# Patient Record
Sex: FEMALE | Race: WHITE | NOT HISPANIC OR LATINO
[De-identification: names, ages, dates, MRNs, and addresses within clinical notes are randomized per-mention and may not be internally consistent; named-entity substitution may affect disease eponyms.]

---

## 2018-08-27 PROBLEM — Z00.00 ENCOUNTER FOR PREVENTIVE HEALTH EXAMINATION: Status: ACTIVE | Noted: 2018-08-27

## 2018-09-04 ENCOUNTER — APPOINTMENT (OUTPATIENT)
Dept: ORTHOPEDIC SURGERY | Facility: CLINIC | Age: 33
End: 2018-09-04

## 2018-09-27 ENCOUNTER — APPOINTMENT (OUTPATIENT)
Dept: PHYSICAL MEDICINE AND REHAB | Facility: CLINIC | Age: 33
End: 2018-09-27

## 2018-11-07 ENCOUNTER — EMERGENCY (EMERGENCY)
Facility: HOSPITAL | Age: 33
LOS: 1 days | Discharge: HOME | End: 2018-11-07
Attending: EMERGENCY MEDICINE | Admitting: EMERGENCY MEDICINE
Payer: COMMERCIAL

## 2018-11-07 VITALS
WEIGHT: 121.92 LBS | OXYGEN SATURATION: 100 % | HEART RATE: 91 BPM | SYSTOLIC BLOOD PRESSURE: 133 MMHG | RESPIRATION RATE: 20 BRPM | DIASTOLIC BLOOD PRESSURE: 95 MMHG | TEMPERATURE: 98 F

## 2018-11-07 DIAGNOSIS — M54.5 LOW BACK PAIN: ICD-10-CM

## 2018-11-07 DIAGNOSIS — M54.9 DORSALGIA, UNSPECIFIED: ICD-10-CM

## 2018-11-07 DIAGNOSIS — Z88.8 ALLERGY STATUS TO OTHER DRUGS, MEDICAMENTS AND BIOLOGICAL SUBSTANCES: ICD-10-CM

## 2018-11-07 DIAGNOSIS — R20.2 PARESTHESIA OF SKIN: ICD-10-CM

## 2018-11-07 PROCEDURE — 99284 EMERGENCY DEPT VISIT MOD MDM: CPT

## 2018-11-07 PROCEDURE — 96372 THER/PROPH/DIAG INJ SC/IM: CPT

## 2018-11-07 PROCEDURE — 99284 EMERGENCY DEPT VISIT MOD MDM: CPT | Mod: 25

## 2018-11-07 RX ORDER — ONDANSETRON 8 MG/1
4 TABLET, FILM COATED ORAL ONCE
Qty: 0 | Refills: 0 | Status: COMPLETED | OUTPATIENT
Start: 2018-11-07 | End: 2018-11-07

## 2018-11-07 RX ORDER — MORPHINE SULFATE 50 MG/1
4 CAPSULE, EXTENDED RELEASE ORAL ONCE
Qty: 0 | Refills: 0 | Status: DISCONTINUED | OUTPATIENT
Start: 2018-11-07 | End: 2018-11-07

## 2018-11-07 RX ADMIN — ONDANSETRON 4 MILLIGRAM(S): 8 TABLET, FILM COATED ORAL at 22:56

## 2018-11-07 RX ADMIN — MORPHINE SULFATE 4 MILLIGRAM(S): 50 CAPSULE, EXTENDED RELEASE ORAL at 22:52

## 2018-11-07 NOTE — ED ADULT NURSE NOTE - OBJECTIVE STATEMENT
Patient presents to the ED with back pain.  she has a history of this pain, has had multiple MRI's and epidurals.  she came in today because pain is unmanageable.  denies any numbness, tingling to legs.  AA&OX3, respirations unlabored, non-diaphoretic, no pallor.

## 2018-11-07 NOTE — ED PROVIDER NOTE - CARE PLAN
Principal Discharge DX:	Back pain  Assessment and plan of treatment:	Likely related to herniated lumbar disc. No alarm features on physical exam or history. Recent MRI performed one week ago

## 2018-11-07 NOTE — ED PROVIDER NOTE - OBJECTIVE STATEMENT
33F PMH L5-S1 herniated disc, cervical radiculopathy, anxiety who presents today with acute back pain that is worse from her baseline. She had an MRI a few days ago which showed the L5-S1 herniation. She has been following with a PMR physician and has received epidural injections. She takes hydrocodone and diclofenac for her pain. She reports her gait difficulty is at baseline. Reports paresthesia of the right extremity which is similar to before. No saddle anesthesia. No bowel or bladder incontinence.

## 2018-11-07 NOTE — ED ADULT NURSE NOTE - NSSISCREENINGQ1_ED_A_ED
Problem: Patient Care Overview  Goal: Plan of Care Review  Outcome: Ongoing (interventions implemented as appropriate)   01/08/18 0911   Coping/Psychosocial   Plan Of Care Reviewed With patient;family     Luci 10/10. AAOx4. VSS per flow sheet. Boothe catheter removed 0845; has urge to void. Pain tolerable per patient. No n/v noted. Hand off report to ESEQUIEL Aguilera SDS. See chart for full assessment.     Problem: Surgery Nonspecified (Adult)  Goal: Signs and Symptoms of Listed Potential Problems Will be Absent, Minimized or Managed (Surgery Nonspecified)  Signs and symptoms of listed potential problems will be absent, minimized or managed by discharge/transition of care (reference Surgery Nonspecified (Adult) CPG).   Outcome: Ongoing (interventions implemented as appropriate)   01/08/18 0911   Surgery Nonspecified   Problems Assessed (Surgery) bleeding/anemia;pain;postoperative nausea and vomiting;postoperative urinary retention;respiratory compromise;situational response   Problems Present (Surgery) pain     Goal: Anesthesia/Sedation Recovery  Outcome: Outcome(s) achieved Date Met: 01/08/18 01/08/18 0911   Goal/Outcome Evaluation   Anesthesia/Sedation Recovery recovered to baseline;criteria met for transfer         
No

## 2018-11-07 NOTE — ED PROVIDER NOTE - ATTENDING CONTRIBUTION TO CARE
exacerbation of chronic low back pain.  no trauma, fever, weakness, bowel/bladder dysfunction.  had mri with report from 10/30 showing disc bulge without signs of cauda equina. given im morphine with improvement.  ambulatory.  has apt for f/u and epidural injection on monday

## 2018-11-07 NOTE — ED PROVIDER NOTE - MEDICAL DECISION MAKING DETAILS
Patient with recent MRI showing L5-S1 herniated disc. No saddle anesthesia, muscle strength at baseline, no bowel or bladder incontinence, right lateral leg paresthesia which is baseline. Will treat pain with morphine and patient already has follow up scheduled with PMR.

## 2018-11-07 NOTE — ED PROVIDER NOTE - PLAN OF CARE
Likely related to herniated lumbar disc. No alarm features on physical exam or history. Recent MRI performed one week ago

## 2018-11-07 NOTE — ED PROVIDER NOTE - PHYSICAL EXAMINATION
Constitutional: NAD, non-toxic, lying in bed comfortably  HEENT: no eye discharge, no nasal discharge, no pharyngeal erythema, moist membranes  Neck: no lymphadenopathy, no JVD,  no carotid bruit  Cardiovascular: S1 and S2, RRR,  no M/R/G, non-displaced PMI  Respiratory: no wheezing, no rhonchi, no cough, no crackles   Gastrointestinal: BS+, soft, non-distended, non-tender, no ascites  Extremities: warm to touch, no edema  Vascular: 2+ peripheral pulses, normal capillary refill  Neurological: AAO x 4, PERRLA, EOMI, no nystagmus, 4/5 muscle strength in b/l LE, sensation intact   Psychiatric: normal mood, normal affect  Musculoskeletal: no joint swelling, no point tenderness over spine  Skin: No rashes, no skin breakdown

## 2018-11-07 NOTE — ED ADULT NURSE NOTE - CHIEF COMPLAINT QUOTE
pt with hx of multiple herniated discs has MD's at [facility] complaining of increased back pain radiating down b/l legs with left arm pain as well. Denies numbness/ tingling took some Hydrocodone prior to arrival

## 2018-11-07 NOTE — ED ADULT TRIAGE NOTE - CHIEF COMPLAINT QUOTE
pt with hx of multiple herniated discs has MD's at [facility] complaining of increased back pain radiating down b/l legs with left arm pain as well. Denies numbness/ tingling took some Hydrocodone prior to arrival

## 2018-11-16 ENCOUNTER — EMERGENCY (EMERGENCY)
Facility: HOSPITAL | Age: 33
LOS: 1 days | Discharge: HOME | End: 2018-11-16
Admitting: EMERGENCY MEDICINE
Payer: COMMERCIAL

## 2018-11-16 VITALS
DIASTOLIC BLOOD PRESSURE: 87 MMHG | RESPIRATION RATE: 16 BRPM | OXYGEN SATURATION: 98 % | SYSTOLIC BLOOD PRESSURE: 135 MMHG | HEART RATE: 101 BPM

## 2018-11-16 VITALS
SYSTOLIC BLOOD PRESSURE: 150 MMHG | HEART RATE: 105 BPM | WEIGHT: 125 LBS | DIASTOLIC BLOOD PRESSURE: 98 MMHG | TEMPERATURE: 99 F | OXYGEN SATURATION: 100 % | RESPIRATION RATE: 16 BRPM

## 2018-11-16 PROCEDURE — 96372 THER/PROPH/DIAG INJ SC/IM: CPT

## 2018-11-16 PROCEDURE — 99284 EMERGENCY DEPT VISIT MOD MDM: CPT

## 2018-11-16 PROCEDURE — 99284 EMERGENCY DEPT VISIT MOD MDM: CPT | Mod: 25

## 2018-11-16 RX ORDER — ONDANSETRON 8 MG/1
4 TABLET, FILM COATED ORAL ONCE
Qty: 0 | Refills: 0 | Status: COMPLETED | OUTPATIENT
Start: 2018-11-16 | End: 2018-11-16

## 2018-11-16 RX ORDER — MORPHINE SULFATE 50 MG/1
4 CAPSULE, EXTENDED RELEASE ORAL ONCE
Qty: 0 | Refills: 0 | Status: DISCONTINUED | OUTPATIENT
Start: 2018-11-16 | End: 2018-11-16

## 2018-11-16 RX ADMIN — MORPHINE SULFATE 4 MILLIGRAM(S): 50 CAPSULE, EXTENDED RELEASE ORAL at 21:09

## 2018-11-16 RX ADMIN — Medication 30 MILLILITER(S): at 21:10

## 2018-11-16 RX ADMIN — ONDANSETRON 4 MILLIGRAM(S): 8 TABLET, FILM COATED ORAL at 20:47

## 2018-11-16 RX ADMIN — MORPHINE SULFATE 4 MILLIGRAM(S): 50 CAPSULE, EXTENDED RELEASE ORAL at 20:47

## 2018-11-16 NOTE — ED PROVIDER NOTE - PHYSICAL EXAMINATION
CONSTITUTIONAL: WD,WN. NAD.    SKIN: Normal color and turgor. No rash.    HEAD: NC/AT.  EYES: Conjunctiva clear. EOMI. PERRL.    ENT: Airway patent, OP clear.   RESPIRATORY:  Breathing non-labored. No retractions or accessory muscle use.  Lungs CTA bilat.  CARDIOVASCULAR:  RRR, S1S2. No M/R/G.      GI:  Abdomen soft, nontender.    MSK: Neck supple with painless ROM.  No lower extremity edema or calf tenderness.  No joint swelling or ROM limitation.  NEURO: Alert and oriented; CN II-XII grossly intact. Speech clear.  5/5 strength deltoids, biceps, triceps, hand , hip flexors, foot plantarflexion, foot dorsiflexion bilaterally.  SILT all extremities.  DTR +2 bilateral quadriceps.  Normal balance and gait.

## 2018-11-16 NOTE — ED PROVIDER NOTE - OBJECTIVE STATEMENT
pt with hx of anxiety, chronic neck and back pain is in process of tapering down her hydrocodone dosage; She filled a 14-day supply prescription for hydrocodone 7.5 mg BID on Nov 5th.  She had a few days when the pain was worse and she needed an extra dose, so she ran out 2-3 days ago.  She has been taking ibuprofen without much relief.  Her doctor prescribed a new prescription for hydrocodone 5 mg BID, but the pharmacist did not feel comfortable filling the prescription until Nov 18th.  She comes to the ED with "rebound pain" in her back.  Similar to past neck and back pains. No hx of trauma.  No fever/chills.  No weakness in extremities, no bladder or bowel dysfunction.  She was seen in the ED Nov 7 for exacerbation of the pain, which was relieved by a dose of morphine 4 mg IM.  She had an MRI Oct 30 showing L5-S1 disc bulge. pt with hx of anxiety, chronic neck and back pain is in process of tapering down her hydrocodone dosage; She filled a 14-day supply prescription for hydrocodone 7.5 mg BID on Nov 5th.  She had a few days when the pain was worse and she needed an extra dose, so she ran out 2-3 days ago.  She has been taking ibuprofen without much relief.  Her doctor prescribed a new prescription for hydrocodone 5 mg BID, but the pharmacist did not feel comfortable filling the prescription until Nov 18th.  She comes to the ED with "rebound pain" in her back.  Similar to past neck and back pains. No hx of trauma.  No fever/chills.  No weakness in extremities, no bladder or bowel dysfunction.  She was seen in the ED Nov 7 for exacerbation of the pain, which was relieved by a dose of morphine 4 mg IM.  She showed me her MRI from Oct 30 showing L5-S1 disc bulge.

## 2018-11-16 NOTE — ED ADULT NURSE NOTE - NSIMPLEMENTINTERV_GEN_ALL_ED
Implemented All Universal Safety Interventions:  [place] to call system. Call bell, personal items and telephone within reach. Instruct patient to call for assistance. Room bathroom lighting operational. Non-slip footwear when patient is off stretcher. Physically safe environment: no spills, clutter or unnecessary equipment. Stretcher in lowest position, wheels locked, appropriate side rails in place.

## 2018-11-16 NOTE — ED PROVIDER NOTE - MEDICAL DECISION MAKING DETAILS
Exacerbation of chronic neck and back pain.  Afebrile, no trauma, no bladder/bowel dysfunction. No back pain red flags (TUNAFISH negative).  Normal strength and sensationi on neuro exam.  DTRs intact.  No spine tenderness.  Will give a single dose of analgesia in the ED; she will follow up with her PMR physician.

## 2018-11-16 NOTE — ED ADULT TRIAGE NOTE - CHIEF COMPLAINT QUOTE
Pt w hx of anxiety and herniated discs presents c/o back pain, states she ran out of her hydrocodone (states she's been taking 40 mg daily prior to her epidural on Monday) and OTC meds do not help.

## 2018-11-16 NOTE — ED PROVIDER NOTE - NS ED ROS FT
CONSTITUTIONAL: No fever, chills, or weakness  NEURO: No headache, no dizziness, no syncope; No focal weakness/tingling/numbness  EYES: No visual changes  ENT: No rhinorrhea or sore throat  PULM: No cough or dyspnea  CV: No chest pain or palpitations  GI: No abdominal pain, vomiting, or diarrhea  : No dysuria, hematuria, frequency  MSK: Back pain.  No joint pain.  No swelling in legs.  SKIN: no rash or unusual bruising

## 2018-11-16 NOTE — ED ADULT NURSE NOTE - OBJECTIVE STATEMENT
Pt walked into ED with c/o of lower back pain that causes headache. She ran out of her medication (oxycodone) and over the counter miedication is not helpful. she has hx of herniated disk and had epidural on Monday. She feels like she needs something for pain to just hold her up for the next refill percription.

## 2018-11-17 ENCOUNTER — EMERGENCY (EMERGENCY)
Facility: HOSPITAL | Age: 33
LOS: 1 days | Discharge: LEFT BEFORE DISCHARGE | End: 2018-11-17
Attending: EMERGENCY MEDICINE | Admitting: EMERGENCY MEDICINE
Payer: COMMERCIAL

## 2018-11-17 VITALS
DIASTOLIC BLOOD PRESSURE: 89 MMHG | HEART RATE: 102 BPM | HEIGHT: 67 IN | SYSTOLIC BLOOD PRESSURE: 132 MMHG | TEMPERATURE: 100 F | OXYGEN SATURATION: 99 % | RESPIRATION RATE: 16 BRPM | WEIGHT: 125 LBS

## 2018-11-17 PROBLEM — M51.26 OTHER INTERVERTEBRAL DISC DISPLACEMENT, LUMBAR REGION: Chronic | Status: ACTIVE | Noted: 2018-11-07

## 2018-11-17 PROBLEM — F41.9 ANXIETY DISORDER, UNSPECIFIED: Chronic | Status: ACTIVE | Noted: 2018-11-07

## 2018-11-17 PROCEDURE — 99283 EMERGENCY DEPT VISIT LOW MDM: CPT

## 2018-11-17 PROCEDURE — 99282 EMERGENCY DEPT VISIT SF MDM: CPT

## 2018-11-17 NOTE — ED ADULT TRIAGE NOTE - OTHER COMPLAINTS
Pt also C/O numbness to bilateral LEs. Pt denies urinary nor bowel incontinence, chest pain, SOB, dizziness.

## 2018-11-17 NOTE — ED PROVIDER NOTE - OBJECTIVE STATEMENT
32 y/o female with hx of chronic neck and back pain c/o neck and back pain and ha. pt states ran out of her pain meds and unable to fill percocet Rx until tomorrow as pharmacist reports it is to early to fill. pt given 14 day percocet rx on 11/5/18 but reports due to worsening pain took extra pills. pt notes ha likely due to lack opoid withdrawal. no trauma. no numbness, tingling or weakness. no incontinence. no abd pain, n/v. Pt seen here for same 1 day ago and given morphine injection in ED and same tx in ED on 11/7/18. no further complaints.

## 2018-11-17 NOTE — ED ADULT NURSE NOTE - CHPI ED NUR SYMPTOMS NEG
no chills/no confusion/no abdominal distension/no fever/no abdominal pain/no weakness/no disorientation

## 2018-11-17 NOTE — ED PROVIDER NOTE - MEDICAL DECISION MAKING DETAILS
chronic neck and back pain and ha. pt well appearing. neuro exam intact. Health commerce reviewed and multiple narcotic prescriptions noted. Last rx for percocet was on 11/5/18 for 14 day and pt reports ran out early due to increase in pain. Also noted 2 recent visits and morhpine given for pain. pt demanding narcotic meds in ED. d/w pt will not tx with narcotics and will need to f/u with her pain mgt MD. pt offered  clonidine and refused. pt to f/u with pmd. chronic neck and back pain and ha. pt well appearing. neuro exam intact. Health commerce reviewed and multiple narcotic prescriptions noted. Last rx for percocet was on 11/5/18 for 14 day and pt reports ran out early due to increase in pain. Also noted 2 recent visits and morhpine given for pain. pt demanding narcotic meds in ED. d/w pt will not tx with narcotics and will need to f/u with her pain mgt MD. pt offered  clonidine for symptoms and refused. pt to f/u with pmd. Pt requesting another MD opinion in ED but eloped prior to second MD opinion

## 2018-11-17 NOTE — ED ADULT TRIAGE NOTE - CHIEF COMPLAINT QUOTE
"I have herniated discs. I have back pain but I ran out of her hydrocodone. I was here yesterday and I got morphine shot. I think I need it again."

## 2018-11-17 NOTE — ED PROVIDER NOTE - MUSCULOSKELETAL, MLM
Spine appears normal, range of motion is not limited, no muscle or joint tenderness. no midline c spine, t spine or l spine tenderness. strength 5/5 b/l UE and b/l LE . distal sensation intact.

## 2018-11-17 NOTE — ED PROVIDER NOTE - ATTENDING CONTRIBUTION TO CARE
pt seen and examined with me. 32 yo F with chronic neck and back pain requesting opiates- takes hydrocodone chronically - states she ran out of  her medication in past 2 days- recently seen in ED in past 24 hrs and given Morphine-no chest pain or sob VSS,  slight nausea, no abd pain or diarrhea - I offered clonidine to control possible withdrawal sx- pt refused-I explained that I would not be prescribing opiates in the ED, and the patient subsequently became verbally abusive and argumentative with staff and then eloped.

## 2018-11-17 NOTE — ED ADULT NURSE REASSESSMENT NOTE - NS ED NURSE REASSESS COMMENT FT1
Pt and  stated to Dr Matthews that they are not interested in other medication options besides opioids. Dr Matthews offered clonidine for patient's concerns for opioid withdrawal. Pt states "My  is a ", subsequently raised voices at Dr Matthews with RN at bedside regarding legality of administration of opioids at [facility]. Pt states to MD "can't you please give me something less hardcore? Like tramadol or tylenol-3's?" Dr Matthews declined. Pt's  states "Why did she receive morphine last night? Why does it depend on who attends at the time whether my wife is getting treated for her pain?" Pt states she is allergic to ketorolac and takes ibuprofen all day and it does not work but that ketorolac makes her eyes swell shut. Pt requested again for Tramadol or Tylenol with codeine, Dr Matthews declined. Pt made comfortable in stretcher with warm blankets and water, pt requests to be seen by alternate attending. Before Dr Matthews could return, pt states "I want to be home, if I wanted to just sedate myself all night I'll go home and take klonopin." Pt educated regarding risks of mixing depressants and benzodiazepines and alcohol. Pt states she is leaving at this time. Pt refused to wait for Dr Matthews for discharge, refused discharge paperwork or vital signs at this time. Pt eloped, Dr Matthews made aware. Pt encouraged to follow up outpatient with primary care doctor and pain mgmt team.

## 2018-11-17 NOTE — ED ADULT NURSE REASSESSMENT NOTE - NS ED NURSE REASSESS COMMENT FT1
Pt states "If you're not going to give me morphine or hydromorphone I am leaving." Dr Matthews notified

## 2018-11-17 NOTE — ED ADULT NURSE NOTE - NSIMPLEMENTINTERV_GEN_ALL_ED
Implemented All Universal Safety Interventions:  [place] to call system. Call bell, personal items and telephone within reach. Instruct patient to call for assistance. Room bathroom lighting operational. Non-slip footwear when patient is off stretcher. Physically safe environment: no spills, clutter or unnecessary equipment. Stretcher in lowest position, wheels locked, appropriate side rails in place.

## 2018-11-17 NOTE — ED ADULT NURSE NOTE - OBJECTIVE STATEMENT
32 y/o F presents in ER stating "I have herniated discs, I am being seen by a pain management doctor, I took too many of my hydrocodone and ran out early, I can refill my prescription tomorrow but I am in pain now and want morphine, I was given it yesterday here in the ER." Pt also states she is "withdrawing from opioids" and has a very bad headache and is photosensitive. Pt states she is "humiliated" to be here. States she needs "help with this" and that she is "suffering, and in hell." +nausea. Dr Matthews aware. Pt drowsy and occasionally slurring speech and closing eyes during conversation.

## 2018-11-20 DIAGNOSIS — G89.29 OTHER CHRONIC PAIN: ICD-10-CM

## 2018-11-20 DIAGNOSIS — M54.2 CERVICALGIA: ICD-10-CM

## 2018-11-20 DIAGNOSIS — M54.9 DORSALGIA, UNSPECIFIED: ICD-10-CM

## 2018-11-20 DIAGNOSIS — Z88.8 ALLERGY STATUS TO OTHER DRUGS, MEDICAMENTS AND BIOLOGICAL SUBSTANCES: ICD-10-CM

## 2018-11-21 DIAGNOSIS — M54.2 CERVICALGIA: ICD-10-CM

## 2018-11-21 DIAGNOSIS — M54.9 DORSALGIA, UNSPECIFIED: ICD-10-CM

## 2018-11-21 DIAGNOSIS — Z88.8 ALLERGY STATUS TO OTHER DRUGS, MEDICAMENTS AND BIOLOGICAL SUBSTANCES: ICD-10-CM

## 2018-11-21 DIAGNOSIS — G89.29 OTHER CHRONIC PAIN: ICD-10-CM

## 2019-06-03 NOTE — ED PROVIDER NOTE - CHIEF COMPLAINT
The patient is a 33y Female complaining of back pain general. (0) No drift; limb holds 90 (or 45) degrees for full 10 secs

## 2020-09-08 ENCOUNTER — EMERGENCY (EMERGENCY)
Facility: HOSPITAL | Age: 35
LOS: 1 days | Discharge: HOME | End: 2020-09-08
Attending: EMERGENCY MEDICINE | Admitting: EMERGENCY MEDICINE
Payer: COMMERCIAL

## 2020-09-08 VITALS
DIASTOLIC BLOOD PRESSURE: 83 MMHG | SYSTOLIC BLOOD PRESSURE: 148 MMHG | TEMPERATURE: 100 F | RESPIRATION RATE: 18 BRPM | OXYGEN SATURATION: 100 % | HEART RATE: 116 BPM | HEIGHT: 67 IN | WEIGHT: 123.02 LBS

## 2020-09-08 LAB
ALBUMIN SERPL ELPH-MCNC: 4.2 G/DL — SIGNIFICANT CHANGE UP (ref 3.3–5)
ALP SERPL-CCNC: 53 U/L — SIGNIFICANT CHANGE UP (ref 40–120)
ALT FLD-CCNC: 13 U/L — SIGNIFICANT CHANGE UP (ref 10–45)
ANION GAP SERPL CALC-SCNC: 10 MMOL/L — SIGNIFICANT CHANGE UP (ref 5–17)
APPEARANCE UR: ABNORMAL
APTT BLD: 28.9 SEC — SIGNIFICANT CHANGE UP (ref 27.5–35.5)
AST SERPL-CCNC: 11 U/L — SIGNIFICANT CHANGE UP (ref 10–40)
BASOPHILS # BLD AUTO: 0.04 K/UL — SIGNIFICANT CHANGE UP (ref 0–0.2)
BASOPHILS NFR BLD AUTO: 0.9 % — SIGNIFICANT CHANGE UP (ref 0–2)
BILIRUB SERPL-MCNC: 0.2 MG/DL — SIGNIFICANT CHANGE UP (ref 0.2–1.2)
BILIRUB UR-MCNC: NEGATIVE — SIGNIFICANT CHANGE UP
BUN SERPL-MCNC: 6 MG/DL — LOW (ref 7–23)
CALCIUM SERPL-MCNC: 8.6 MG/DL — SIGNIFICANT CHANGE UP (ref 8.4–10.5)
CHLORIDE SERPL-SCNC: 104 MMOL/L — SIGNIFICANT CHANGE UP (ref 96–108)
CO2 SERPL-SCNC: 24 MMOL/L — SIGNIFICANT CHANGE UP (ref 22–31)
COLOR SPEC: YELLOW — SIGNIFICANT CHANGE UP
CREAT SERPL-MCNC: 0.8 MG/DL — SIGNIFICANT CHANGE UP (ref 0.5–1.3)
DIFF PNL FLD: NEGATIVE — SIGNIFICANT CHANGE UP
EOSINOPHIL # BLD AUTO: 0.03 K/UL — SIGNIFICANT CHANGE UP (ref 0–0.5)
EOSINOPHIL NFR BLD AUTO: 0.7 % — SIGNIFICANT CHANGE UP (ref 0–6)
GLUCOSE SERPL-MCNC: 95 MG/DL — SIGNIFICANT CHANGE UP (ref 70–99)
GLUCOSE UR QL: NEGATIVE — SIGNIFICANT CHANGE UP
HCT VFR BLD CALC: 31.4 % — LOW (ref 34.5–45)
HGB BLD-MCNC: 9.2 G/DL — LOW (ref 11.5–15.5)
IMM GRANULOCYTES NFR BLD AUTO: 0.2 % — SIGNIFICANT CHANGE UP (ref 0–1.5)
INR BLD: 1.02 — SIGNIFICANT CHANGE UP (ref 0.88–1.16)
KETONES UR-MCNC: NEGATIVE — SIGNIFICANT CHANGE UP
LEUKOCYTE ESTERASE UR-ACNC: NEGATIVE — SIGNIFICANT CHANGE UP
LYMPHOCYTES # BLD AUTO: 1.13 K/UL — SIGNIFICANT CHANGE UP (ref 1–3.3)
LYMPHOCYTES # BLD AUTO: 25.6 % — SIGNIFICANT CHANGE UP (ref 13–44)
MCHC RBC-ENTMCNC: 22.6 PG — LOW (ref 27–34)
MCHC RBC-ENTMCNC: 29.3 GM/DL — LOW (ref 32–36)
MCV RBC AUTO: 77.1 FL — LOW (ref 80–100)
MONOCYTES # BLD AUTO: 0.36 K/UL — SIGNIFICANT CHANGE UP (ref 0–0.9)
MONOCYTES NFR BLD AUTO: 8.2 % — SIGNIFICANT CHANGE UP (ref 2–14)
NEUTROPHILS # BLD AUTO: 2.84 K/UL — SIGNIFICANT CHANGE UP (ref 1.8–7.4)
NEUTROPHILS NFR BLD AUTO: 64.4 % — SIGNIFICANT CHANGE UP (ref 43–77)
NITRITE UR-MCNC: NEGATIVE — SIGNIFICANT CHANGE UP
NRBC # BLD: 0 /100 WBCS — SIGNIFICANT CHANGE UP (ref 0–0)
PH UR: 7 — SIGNIFICANT CHANGE UP (ref 5–8)
PLATELET # BLD AUTO: 314 K/UL — SIGNIFICANT CHANGE UP (ref 150–400)
POTASSIUM SERPL-MCNC: 4.1 MMOL/L — SIGNIFICANT CHANGE UP (ref 3.5–5.3)
POTASSIUM SERPL-SCNC: 4.1 MMOL/L — SIGNIFICANT CHANGE UP (ref 3.5–5.3)
PROT SERPL-MCNC: 6.8 G/DL — SIGNIFICANT CHANGE UP (ref 6–8.3)
PROT UR-MCNC: ABNORMAL MG/DL
PROTHROM AB SERPL-ACNC: 12.2 SEC — SIGNIFICANT CHANGE UP (ref 10.6–13.6)
RBC # BLD: 4.07 M/UL — SIGNIFICANT CHANGE UP (ref 3.8–5.2)
RBC # FLD: 14.9 % — HIGH (ref 10.3–14.5)
SODIUM SERPL-SCNC: 138 MMOL/L — SIGNIFICANT CHANGE UP (ref 135–145)
SP GR SPEC: 1.01 — SIGNIFICANT CHANGE UP (ref 1–1.03)
UROBILINOGEN FLD QL: 0.2 E.U./DL — SIGNIFICANT CHANGE UP
WBC # BLD: 4.41 K/UL — SIGNIFICANT CHANGE UP (ref 3.8–10.5)
WBC # FLD AUTO: 4.41 K/UL — SIGNIFICANT CHANGE UP (ref 3.8–10.5)

## 2020-09-08 PROCEDURE — 76830 TRANSVAGINAL US NON-OB: CPT | Mod: 26

## 2020-09-08 PROCEDURE — 76856 US EXAM PELVIC COMPLETE: CPT | Mod: 26

## 2020-09-08 PROCEDURE — 99285 EMERGENCY DEPT VISIT HI MDM: CPT

## 2020-09-08 RX ORDER — FLUOXETINE HCL 10 MG
1 CAPSULE ORAL
Qty: 0 | Refills: 0 | DISCHARGE

## 2020-09-08 RX ORDER — CLONAZEPAM 1 MG
1 TABLET ORAL
Qty: 0 | Refills: 0 | DISCHARGE

## 2020-09-08 RX ORDER — HYOSCYAMINE SULFATE 0.13 MG
1 TABLET ORAL
Qty: 0 | Refills: 0 | DISCHARGE

## 2020-09-08 RX ORDER — ONDANSETRON 8 MG/1
1 TABLET, FILM COATED ORAL
Qty: 0 | Refills: 0 | DISCHARGE

## 2020-09-08 RX ORDER — BUPROPION HYDROCHLORIDE 150 MG/1
1 TABLET, EXTENDED RELEASE ORAL
Qty: 0 | Refills: 0 | DISCHARGE

## 2020-09-08 RX ORDER — ONDANSETRON 8 MG/1
4 TABLET, FILM COATED ORAL ONCE
Refills: 0 | Status: COMPLETED | OUTPATIENT
Start: 2020-09-08 | End: 2020-09-08

## 2020-09-08 RX ORDER — BACLOFEN 100 %
1 POWDER (GRAM) MISCELLANEOUS
Qty: 0 | Refills: 0 | DISCHARGE

## 2020-09-08 RX ORDER — MORPHINE SULFATE 50 MG/1
2 CAPSULE, EXTENDED RELEASE ORAL ONCE
Refills: 0 | Status: DISCONTINUED | OUTPATIENT
Start: 2020-09-08 | End: 2020-09-08

## 2020-09-08 RX ORDER — SODIUM CHLORIDE 9 MG/ML
1000 INJECTION INTRAMUSCULAR; INTRAVENOUS; SUBCUTANEOUS ONCE
Refills: 0 | Status: COMPLETED | OUTPATIENT
Start: 2020-09-08 | End: 2020-09-08

## 2020-09-08 RX ORDER — DEXTROAMPHETAMINE SACCHARATE, AMPHETAMINE ASPARTATE, DEXTROAMPHETAMINE SULFATE AND AMPHETAMINE SULFATE 1.875; 1.875; 1.875; 1.875 MG/1; MG/1; MG/1; MG/1
30 TABLET ORAL
Qty: 0 | Refills: 0 | DISCHARGE

## 2020-09-08 RX ADMIN — SODIUM CHLORIDE 1000 MILLILITER(S): 9 INJECTION INTRAMUSCULAR; INTRAVENOUS; SUBCUTANEOUS at 22:25

## 2020-09-08 RX ADMIN — ONDANSETRON 4 MILLIGRAM(S): 8 TABLET, FILM COATED ORAL at 22:25

## 2020-09-08 RX ADMIN — SODIUM CHLORIDE 1000 MILLILITER(S): 9 INJECTION INTRAMUSCULAR; INTRAVENOUS; SUBCUTANEOUS at 23:25

## 2020-09-08 RX ADMIN — MORPHINE SULFATE 2 MILLIGRAM(S): 50 CAPSULE, EXTENDED RELEASE ORAL at 22:25

## 2020-09-08 RX ADMIN — MORPHINE SULFATE 2 MILLIGRAM(S): 50 CAPSULE, EXTENDED RELEASE ORAL at 22:40

## 2020-09-08 NOTE — ED PROVIDER NOTE - PATIENT PORTAL LINK FT
You can access the FollowMyHealth Patient Portal offered by [facility] by registering at the following website: [email or web address]. By joining [employer]’s FollowMyHealth portal, you will also be able to view your health information using other applications (apps) compatible with our system.

## 2020-09-08 NOTE — ED ADULT NURSE NOTE - OBJECTIVE STATEMENT
LLQ pain on and off  with N/D since Tuesday last week, seen her pmd by tele med on Saturday and prescribed Augmentin 500mg BID and prn Zofran started Saturday, with chest pressure every time with diarrhea since yesterday    diffuse, non-specific tenderness noted to LLQ abdomen, no active emesis

## 2020-09-08 NOTE — ED ADULT TRIAGE NOTE - CHIEF COMPLAINT QUOTE
LLQ pain on and off  with N/D since Tuesday last week, seen her pmd by tele med on Saturday and prescribed Augmentin 500mg BID and prn Zofran started Saturday, with chest pressure every time with diarrhea since yesterday

## 2020-09-08 NOTE — ED PROVIDER NOTE - SHIFT CHANGE DETAILS
35F pcos c/o LLQ abd pain/nausea/diarrhea. +LLQ abd ttp but no adnexal ttp. labs wnl. ct a/p wnl. found to have nearly 4cm L ovarian cyst on pelvic u/s. gyn consulted.    dispo: gyn reccs, anticipate dc home w/ outpatient fu 35F pcos c/o LLQ abd pain/nausea/diarrhea. +LLQ abd ttp but ?adnexal ttp. labs wnl. ct a/p wnl. given persistent LLQ abd pain and ~4cm L ovarian cyst on pelvic u/s, gyn consulted.    dispo: gyn reccs, anticipate dc home w/ outpatient fu

## 2020-09-08 NOTE — ED PROVIDER NOTE - CLINICAL SUMMARY MEDICAL DECISION MAKING FREE TEXT BOX
avss. no systemic sx. no acute surgical abd. no e/o sepsis. LFTs/lipase neg. ua neg. ct a/p w/o acute abnl. found to have ~4cm L ovary w/ persistent LLQ abd pain req morphine. given ?L adnexal ttp on exam and persistent LLQ abd pain, gyn consulted. s/o'd overnight team pending gyn reccs.

## 2020-09-08 NOTE — ED PROVIDER NOTE - OBJECTIVE STATEMENT
35F pcos, ibs, anxiety, c/o acute severe sharp LLQ abd pain radiating to L pelvis lasting ~15min 6d ago w/ subsequent less severe intermittent LLQ abd pain. +nausea. +loose brown diarrhea daily x4d now improved. seen by telemedicine 3d ago and started on augmentin/bentyl for poss diverticulitis but w/o improvement. no fever/chills, no uri/cough, no cp/sob, no vomiting, no vaginal discharge, no pain w/ coitus, no phx std/high risk sexual activity, no hematochezia/melena, no dysuria/hematuria, no rash, no prior covid, no trauma, no recent travel, no phx diverticular disease, no prior colonoscopy. 35F pcos not on ocp, ibs, anxiety, c/o acute severe sharp LLQ abd pain radiating to L pelvis lasting ~15min 6d ago w/ subsequent less severe intermittent LLQ abd pain. +nausea. +loose brown diarrhea daily x4d now improved. seen by telemedicine 3d ago and started on augmentin/bentyl for poss diverticulitis but w/o improvement. no fever/chills, no uri/cough, no cp/sob, no vomiting, no vaginal discharge, no pain w/ coitus, no phx std/high risk sexual activity, no hematochezia/melena, no dysuria/hematuria, no rash, no prior covid, no trauma, no recent travel, no phx diverticular disease, no prior colonoscopy.

## 2020-09-08 NOTE — ED PROVIDER NOTE - PHYSICAL EXAMINATION
CONST: nontoxic NAD speaking in full sentences  HEAD: atraumatic  EYES: conjunctivae clear, PERRL, EOMI  ENT: mmm  NECK: supple  CARD: rrr no murmurs  CHEST: ctab no r/r/w, no stridor/retractions/tripoding  ABD: soft, nd, +ttp LLQ, +guarding, no rebound, no cvat  PELVIS: nl appearing female genitilia, no vaginal discharge, no cmt, no adnexal ttp  EXT: FROM, symmetric distal pulses intact  SKIN: warm, dry, no rash, no pedal edema, cap refill <2sec  NEURO: a+ox3, 5/5 strength x4, gross sensation intact x4, normal gait CONST: nontoxic NAD speaking in full sentences  HEAD: atraumatic  EYES: conjunctivae clear, PERRL, EOMI  ENT: mmm  NECK: supple  CARD: rrr no murmurs  CHEST: ctab no r/r/w, no stridor/retractions/tripoding  ABD: soft, nd, +ttp LLQ, +guarding, no rebound, no cvat  PELVIS: nl appearing female genitilia, no vaginal discharge, no cmt, ?L adnexal ttp  EXT: FROM, symmetric distal pulses intact  SKIN: warm, dry, no rash, no pedal edema, cap refill <2sec  NEURO: a+ox3, 5/5 strength x4, gross sensation intact x4, normal gait

## 2020-09-08 NOTE — ED PROVIDER NOTE - PROGRESS NOTE DETAILS
gyn consulted. will see pt. given persistent LLQ abd pain and neg ct, gyn consulted. will see pt. pt seen by GYN, cleared from their standpoint.   Final CT scan report suggests colitis of transverse and proximal descending colon, will recommend continuing augmentin.

## 2020-09-08 NOTE — ED ADULT NURSE NOTE - CHPI ED NUR SYMPTOMS NEG
no blood in stool/no dysuria/no burning urination/no chills/no hematuria/no abdominal distension/no vomiting

## 2020-09-08 NOTE — ED PROVIDER NOTE - NSFOLLOWUPINSTRUCTIONS_ED_ALL_ED_FT
Continue antibiotics (Augmentin) for colitis, switch to 875 mg tablets.  Please follow up with gastroenterologist and gynecologist - call today.  Return to the Emergency Department if you have any new or worsening symptoms, or if you have any concerns.  =====================================  COLITIS - AfterCare(R) Instructions(ER/ED)     Colitis    WHAT YOU NEED TO KNOW:    Colitis is swelling and irritation of your colon. Colitis may be caused by ulcers or a problem with your immune system. Bacteria, a virus, or a parasite may also cause colitis. The cause may not be known. You may have diarrhea, abdominal pain, fever, or blood or mucus in your bowel movement.    DISCHARGE INSTRUCTIONS:    Return to the emergency department if:     You have sudden trouble breathing.      Your bowel movements are black or have blood in them.      You have blood in your vomit.      You have severe abdominal pain or your abdomen is swollen and feels hard.      You have any of the following signs of dehydration:   Dizziness or weakness      Dry mouth, cracked lips, or severe thirst      Fast heartbeat or breathing      Urinating very little or not at all    Call your doctor if:     Your symptoms get worse or do not go away.      You have a fever, chills, cough, or feel weak and achy.      You suddenly lose weight without trying.      You have questions or concerns about your condition or care.    Medicines:     Medicines may be given to decrease inflammation in your colon and treat diarrhea.      Take your medicine as directed. Contact your healthcare provider if you think your medicine is not helping or if you have side effects. Tell him of her if you are allergic to any medicine. Keep a list of the medicines, vitamins, and herbs you take. Include the amounts, and when and why you take them. Bring the list or the pill bottles to follow-up visits. Carry your medicine list with you in case of an emergency.    Manage your symptoms:     Drink liquids as directed to help prevent dehydration. Good liquids to drink include water, juice, and broth. Ask how much liquid to drink each day. You may need to drink an oral rehydration solution (ORS). An ORS contains a balance of water, salt, and sugar to replace body fluids lost during diarrhea.      Eat a variety of healthy foods. Healthy foods include fruits, vegetables, whole-grain breads, beans, low-fat dairy products, lean meats, and fish. You may need to eat several small meals throughout the day instead of large meals. Avoid spicy foods, caffeine, chocolate, and foods high in fat.      Talk to your healthcare provider before you take NSAIDs. NSAIDs can cause worsen your symptoms if ulcers are causing your colitis.      Start to exercise when you feel better. Regular exercise helps your bowels work normally. Ask about the best exercise plan for you.    Prevent the spread of germs:          Wash your hands often. Wash your hands several times each day. Wash after you use the bathroom, change a child's diaper, and before you prepare or eat food. Use soap and water every time. Rub your soapy hands together, lacing your fingers. Wash the front and back of your hands, and in between your fingers. Use the fingers of one hand to scrub under the fingernails of the other hand. Wash for at least 20 seconds. Rinse with warm, running water for several seconds. Then dry your hands with a clean towel or paper towel. Use hand  that contains alcohol if soap and water are not available. Do not touch your eyes, nose, or mouth without washing your hands first.Handwashing           Cover a sneeze or cough. Use a tissue that covers your mouth and nose. Throw the tissue away in a trash can right away. Use the bend of your arm if a tissue is not available. Wash your hands well with soap and water or use a hand .      Clean surfaces often. Clean doorknobs, countertops, cell phones, and other surfaces that are touched often. Use a disinfecting wipe, a single-use sponge, or a cloth you can wash and reuse. Use disinfecting  if you do not have wipes. You can create a disinfecting  by mixing 1 part bleach with 10 parts water.      Ask about vaccines you may need. Vaccines help prevent disease caused by some viruses and bacteria. Get the influenza (flu) vaccine as soon as recommended each year. The flu vaccine is usually available starting in September or October. Flu viruses change, so it is important to get a flu vaccine every year. Get the pneumonia vaccine if recommended. This vaccine is usually recommended every 5 years. Your provider will tell you when to get this vaccine, if needed. Your healthcare provider can tell you if you should get other vaccines, and when to get them.    Follow up with your doctor as directed: You may need to return for a colonoscopy or other tests. Write down how often you have a bowel movements and what they look like. Bring this to your follow-up visits. Write down your questions so you remember to ask them during your visits.

## 2020-09-09 VITALS
OXYGEN SATURATION: 100 % | RESPIRATION RATE: 17 BRPM | HEART RATE: 78 BPM | TEMPERATURE: 98 F | SYSTOLIC BLOOD PRESSURE: 122 MMHG | DIASTOLIC BLOOD PRESSURE: 74 MMHG

## 2020-09-09 PROCEDURE — 36415 COLL VENOUS BLD VENIPUNCTURE: CPT

## 2020-09-09 PROCEDURE — 74177 CT ABD & PELVIS W/CONTRAST: CPT | Mod: 26

## 2020-09-09 PROCEDURE — 85025 COMPLETE CBC W/AUTO DIFF WBC: CPT

## 2020-09-09 PROCEDURE — 76856 US EXAM PELVIC COMPLETE: CPT

## 2020-09-09 PROCEDURE — 96361 HYDRATE IV INFUSION ADD-ON: CPT

## 2020-09-09 PROCEDURE — 85730 THROMBOPLASTIN TIME PARTIAL: CPT

## 2020-09-09 PROCEDURE — 80053 COMPREHEN METABOLIC PANEL: CPT

## 2020-09-09 PROCEDURE — 96375 TX/PRO/DX INJ NEW DRUG ADDON: CPT

## 2020-09-09 PROCEDURE — 74177 CT ABD & PELVIS W/CONTRAST: CPT

## 2020-09-09 PROCEDURE — 99284 EMERGENCY DEPT VISIT MOD MDM: CPT | Mod: 25

## 2020-09-09 PROCEDURE — 85610 PROTHROMBIN TIME: CPT

## 2020-09-09 PROCEDURE — 96376 TX/PRO/DX INJ SAME DRUG ADON: CPT

## 2020-09-09 PROCEDURE — 81001 URINALYSIS AUTO W/SCOPE: CPT

## 2020-09-09 PROCEDURE — 76830 TRANSVAGINAL US NON-OB: CPT

## 2020-09-09 PROCEDURE — 96374 THER/PROPH/DIAG INJ IV PUSH: CPT | Mod: XU

## 2020-09-09 RX ORDER — ONDANSETRON 8 MG/1
4 TABLET, FILM COATED ORAL ONCE
Refills: 0 | Status: COMPLETED | OUTPATIENT
Start: 2020-09-09 | End: 2020-09-09

## 2020-09-09 RX ORDER — MORPHINE SULFATE 50 MG/1
2 CAPSULE, EXTENDED RELEASE ORAL ONCE
Refills: 0 | Status: DISCONTINUED | OUTPATIENT
Start: 2020-09-09 | End: 2020-09-09

## 2020-09-09 RX ADMIN — MORPHINE SULFATE 2 MILLIGRAM(S): 50 CAPSULE, EXTENDED RELEASE ORAL at 03:50

## 2020-09-09 RX ADMIN — MORPHINE SULFATE 2 MILLIGRAM(S): 50 CAPSULE, EXTENDED RELEASE ORAL at 03:35

## 2020-09-09 RX ADMIN — ONDANSETRON 4 MILLIGRAM(S): 8 TABLET, FILM COATED ORAL at 03:35

## 2020-09-09 RX ADMIN — MORPHINE SULFATE 2 MILLIGRAM(S): 50 CAPSULE, EXTENDED RELEASE ORAL at 00:13

## 2020-09-09 RX ADMIN — MORPHINE SULFATE 2 MILLIGRAM(S): 50 CAPSULE, EXTENDED RELEASE ORAL at 00:28

## 2020-09-09 NOTE — CONSULT NOTE ADULT - ASSESSMENT
36yo  presents to the ED with LLQ pain and sudha 36yo  presents to the ED with LLQ pain and diarrhea for the last 2 days. In ED: /74 HR 78 (initially tachycardic to 116 which quickly resolved) afebrile, WBC 4, UPT negative. found on CT to have colitis, and a 3cm uterine fibroids, w/TVUS significant for uterine fibroid.     # LLQ pain  - Patient is hemodynamically stable with benign abdominal exam. Pain is unlikely GYN in nature. No evidence of torsion or large cyst. 3cm simple cyst seen on ultrasound unlikely to cause torsion, more likely follicle given patient is nearing ovulation in her menses. No abnormal discharge or evidence of PID. Unlikely Mittelschmerz as no signs of rupture follicle or cyst.   -  Most likely colitis given CT findings and recent history of diarrhea and gas pain.   - Patient denies history of heavy or painful periods, can follow up with GYN regarding uterine fibroid although no intervention needed at this time.   - Patient advised to f/u with outpatient GYN especially given need for annual papsmear.     # Colitis  - Defer management to ED physician    Discussed w/Dr. Holcomb PGY4 and Dr. Underwood

## 2020-09-09 NOTE — ED ADULT NURSE REASSESSMENT NOTE - NS ED NURSE REASSESS COMMENT FT1
Dr. Egan at bedside for evaluation
Dr. Egan at bedside for pelvic, still awaiting urine sample
Dr. Egan at bedside speaking with pt. and spouse for update, per request
GYN at bedside for evaluation
GYN resident back at bedside discussing findings/plan with pt. and spouse
TAMELA Catherine at bedside discussing dispo. with pt. and spouse
assessment as noted, provided with specimen cup for urine, warm blankets for comfort, d/w Dr. Egan, who states will evaluate prior to orders, pt. aware, spouse at bedside, meds and allergies updated
pt. ambulated to restroom for attempted urine sample
pt. ambulated to restroom with steady gait, still awaiting CT result
pt. moved to room L, per GYN request, awaiting arrival, pt. utd, with understanding verbalized
still awaiting GYN arrival, nad, provided with warm blankets for comfort
urine sample was collected and sent, t/p here>US, Urine Pregnancy in progress, pt. utd, with understanding verbalized
interventions as noted, ash. well, assessment on-going, awaiting Urine/US
medicated for pain, per request, to CT accompanied by SSA
medicated once again for pain as noted, ash. well, assessment on-going
pt. back to bed 7, self ambulated from L, states "I'm tired of waiting for GYN, and I'm ready to go home, TAMELA Catherine at bedside d/w pt., states to hold currently ordered pain meds for GYN exam, pt. agrees to wait for a few more minutes, spouse remains at bedside

## 2020-09-09 NOTE — CONSULT NOTE ADULT - SUBJECTIVE AND OBJECTIVE BOX
INCOMPLETE  35y   HPI:  34yo  w/PMH anxiety and depression presenting for left lower quadrant pain that began monday evening. She describe the pain as initially sharp on day 1 and then transitioned to dull throbbing pain since then. Endorsing nausea, diarrhea and gas pain. Denies vomiting, fevers, chills, vaginal discharge, vaginal bleeding, vaginal burning/itching, no urinary symptoms. She went to urgen care on Tuesday and they gave her Augmention for suspected diverticulitis and GasX.  She is currently sexually active with her  although has not been having intercourse due to pain. LMP . Using condoms for birth control.     PGYN: last papsmear 4 years ago, wnl  denies fibroids, cysts, polyps  condoms for birth control       PAST MEDICAL & SURGICAL HISTORY:  Anxiety  Herniated lumbar intervertebral disc  No significant past surgical history      SOCIAL HISTORY:    FAMILY HISTORY:  No pertinent family history in first degree relatives         Patient Currently Takes Medications as of 08-Sep-2020 21:44 documented in Structured Notes  · 	Adderall 30 mg oral tablet: Last Dose Taken:  , 30 milligram(s) orally 3 times a day  · 	baclofen 10 mg oral tablet: Last Dose Taken:  , 1 tab(s) orally 3 times a day  · 	Fioricet oral capsule: Last Dose Taken:  , 1 cap(s) orally 3 times a day  · 	Wellbutrin  mg/24 hours oral tablet, extended release: Last Dose Taken:  , 1 tab(s) orally every 24 hours  · 	PROzac 10 mg oral capsule: Last Dose Taken:  , 1 cap(s) orally once a day  · 	KlonoPIN 1 mg oral tablet: Last Dose Taken:  , 1 tab(s) orally 4 times a day  · 	Augmentin 500 mg-125 mg oral tablet: Last Dose Taken:  , 1 tab(s) orally 2 times a day  · 	Zofran ODT 4 mg oral tablet, disintegratin tab(s) orally 3 times a day, As Needed  · 	dicyclomine 10 mg oral capsule: Last Dose Taken:  , 2 cap(s) orally 4 times a day, As Needed, states stopped taking due to side effects  · 	hyoscyamine 0.125 mg oral tablet: Last Dose Taken:  , 1 tab(s) orally 4 times a day        Allergies    Ambien (Rash)  Benadryl (Hives)  Flexeril (Hives)  Lunesta (Rash)  Medrol (Rash)  promethazine (Rash; Urticaria; Hives)  Reglan (Rash; Urticaria)  Toradol (Rash; Urticaria)  Xanax (Hives)    Intolerances        PHYSICAL EXAM:   Vital Signs Last 24 Hrs  T(C): 36.7 (09 Sep 2020 04:00), Max: 37.8 (08 Sep 2020 21:31)  T(F): 98 (09 Sep 2020 04:00), Max: 100 (08 Sep 2020 21:31)  HR: 78 (09 Sep 2020 04:00) (78 - 116)  BP: 122/74 (09 Sep 2020 04:00) (114/81 - 148/83)  BP(mean): --  RR: 17 (09 Sep 2020 04:00) (17 - 18)  SpO2: 100% (09 Sep 2020 04:00) (100% - 100%)    **************************  Constitutional: Alert & Oriented x3, No acute distress, cooperative   Respiratory: Clear to ausculation bilaterally; no wheezing, rhonchi, or crackles  Cardiovascular: S1 &S2 physiologic, no murmurs, or gallops  Gastrointestinal: point tenderness in LLQ, otherwise soft, nondistended, no reboud or guarding, positive bowel sounds, no rashes  Speculum exam: copious white vaginal discharge, cervix appears pink and closed, no vaginal bleeding or abnormal discharge  Pelvic exam: uterus normal in size, mobile, nontender, no CMT, no adnexal masses palpated   Extremities: no calf tenderness or swelling      LABS:                        9.2    4.41  )-----------( 314      ( 08 Sep 2020 22:28 )             31.4         138  |  104  |  6<L>  ----------------------------<  95  4.1   |  24  |  0.80    Ca    8.6      08 Sep 2020 22:28    TPro  6.8  /  Alb  4.2  /  TBili  0.2  /  DBili  x   /  AST  11  /  ALT  13  /  AlkPhos  53  -08    PT/INR - ( 08 Sep 2020 22:28 )   PT: 12.2 sec;   INR: 1.02          PTT - ( 08 Sep 2020 22:28 )  PTT:28.9 sec  Urinalysis Basic - ( 08 Sep 2020 22:51 )    Color: Yellow / Appearance: SL Cloudy / S.010 / pH: x  Gluc: x / Ketone: NEGATIVE  / Bili: Negative / Urobili: 0.2 E.U./dL   Blood: x / Protein: Trace mg/dL / Nitrite: NEGATIVE   Leuk Esterase: NEGATIVE / RBC: < 5 /HPF / WBC < 5 /HPF   Sq Epi: x / Non Sq Epi: Moderate /HPF / Bacteria: Present /HPF        RADIOLOGY & ADDITIONAL STUDIES:  AM:  CT ABDOMEN AND PELVIS IC                          PROCEDURE DATE:  2020          INTERPRETATION:  CT SCAN OF ABDOMEN AND PELVIS    History: Left lower quadrant pain.    Technique: CT scan of abdomen and pelvis was performed from lung bases through symphysis pubis. Intravenous contrast was utilized and oral contrast was not given. Axial, sagittal and coronal reformatted images were reviewed.    Comparison: US pelvis from 20.    Findings:    Lower chest: Normal.    Liver:  Hepatomegaly.    Gallbladder: No radiopaque stones gallbladder.    Spleen:  Normal.    Pancreas:  Normal.    Adrenal glands:  Normal.    Kidneys: Normal.    Adenopathy:  No lymphadenopathy in abdomen or pelvis.    Ascites: None.    Gastrointestinal tract: Evaluation of the GI tract without the use of oral contrast shows no appreciable luminal dilation or bowel wall thickening. Appendix is normal. Scattered colonic diverticula.    Vessels: Normal.    Pelvic organs: 3.0 x 3.4 posterior uterine body fibroid.    Soft tissues: Normal.    Bones: Minimal s-shaped curvature of the thoracolumbar spine.    Impression:  No acute intra-abdominal pathology.    3.4 cm uterine fibroid.    Hepatomegaly.    I, Yayo Walls MD, have reviewed the images and the report and agree with the findings, with the following modification: There is appearance of mild colonic wall thickening involving the transverse and proximal descending colon suggesting colitis.          Thank you for the opportunity to participate in the care of this patient.    YADIRA JOLLEY M.D., RADIOLOGY RESIDENT  This document has been electronically signed.  YAYO WALLS M.D., ATTENDING RADIOLOGIST  This document has been electronically signed. Sep  9 2020  2:26AM      EXAM:  US TRANSVAGINAL                          EXAM:  US PELVIC COMPLETE                          PROCEDURE DATE:  2020          INTERPRETATION:  PELVIC ULTRASOUND dated 2020 11:04 PM    INDICATION: Left pelvic pain, history of PCOS.  AGE: 35 LMP: 8/15/2020    TECHNIQUE: Transabdominal views of the pelvis were obtained followed by transvaginal views for better visualization of the ovaries.    PRIOR STUDIES: None    FINDINGS:  The uterus is normal in size and shape, measuring 8.6 x 4.2 x 4.9 cm. There is a 2.8 x 3.0 x 2.8 cm posterior uterine fibroid. The endometrium is 1.4 cm in thickness, which is normal. Trace amount of fluid is seen within the cervix.    The right ovary is normal in size, measuring 3.3 x 1.7 x 2.8 cm with a calculated volume of 7.9 mL. The left ovary is normal in size, measuring 3.3 x 3.8 x 3.5 cm with a calculated volume of 20 2. mL. Doppler evaluation demonstrates flow to both ovaries with no evidence of torsion.    No free fluid is seen in the cul-de-sac.      IMPRESSION:  No acute pathology.    3.0 cm uterine fibroid.          Thank you for the opportunity to participate in the care of this patient.    YADIRA JOLLEY M.D., RADIOLOGY RESIDENT  This document has been electronically signed.  YAYO WALLS M.D., ATTENDING RADIOLOGIST  This document has been electronically signed. Sep  9 2020 12:52AM INCOMPLETE  35y   HPI:  34yo  w/PMH anxiety and depression presenting for left lower quadrant pain that began monday evening. She describe the pain as initially sharp on day 1 and then transitioned to dull throbbing pain since then. Endorsing nausea, diarrhea and gas pain. Denies vomiting, fevers, chills, vaginal discharge, vaginal bleeding, vaginal burning/itching, no urinary symptoms. She went to urgent care on Tuesday and they gave her Augmention for suspected diverticulitis and GasX with no resolution of pain.  She is currently sexually active with her  although has not been having intercourse due to pain. LMP . Using condoms for birth control.   In ED: /74 HR 78 (initially tachycardic to 116 which quickly resolved) afebrile, WBC 4, found on CT to have colitis, and a 3cm uterine fibroids, w/TVUS significant for uterine fibroid.       PGYN: last papsmear 4 years ago, wnl  denies fibroids, cysts, polyps  condoms for birth control       PAST MEDICAL & SURGICAL HISTORY:  Anxiety  Herniated lumbar intervertebral disc  No significant past surgical history      SOCIAL HISTORY:    FAMILY HISTORY:  No pertinent family history in first degree relatives         Patient Currently Takes Medications as of 08-Sep-2020 21:44 documented in Structured Notes  · 	Adderall 30 mg oral tablet: Last Dose Taken:  , 30 milligram(s) orally 3 times a day  · 	baclofen 10 mg oral tablet: Last Dose Taken:  , 1 tab(s) orally 3 times a day  · 	Fioricet oral capsule: Last Dose Taken:  , 1 cap(s) orally 3 times a day  · 	Wellbutrin  mg/24 hours oral tablet, extended release: Last Dose Taken:  , 1 tab(s) orally every 24 hours  · 	PROzac 10 mg oral capsule: Last Dose Taken:  , 1 cap(s) orally once a day  · 	KlonoPIN 1 mg oral tablet: Last Dose Taken:  , 1 tab(s) orally 4 times a day  · 	Augmentin 500 mg-125 mg oral tablet: Last Dose Taken:  , 1 tab(s) orally 2 times a day  · 	Zofran ODT 4 mg oral tablet, disintegratin tab(s) orally 3 times a day, As Needed  · 	dicyclomine 10 mg oral capsule: Last Dose Taken:  , 2 cap(s) orally 4 times a day, As Needed, states stopped taking due to side effects  · 	hyoscyamine 0.125 mg oral tablet: Last Dose Taken:  , 1 tab(s) orally 4 times a day        Allergies    Ambien (Rash)  Benadryl (Hives)  Flexeril (Hives)  Lunesta (Rash)  Medrol (Rash)  promethazine (Rash; Urticaria; Hives)  Reglan (Rash; Urticaria)  Toradol (Rash; Urticaria)  Xanax (Hives)    Intolerances        PHYSICAL EXAM:   Vital Signs Last 24 Hrs  T(C): 36.7 (09 Sep 2020 04:00), Max: 37.8 (08 Sep 2020 21:31)  T(F): 98 (09 Sep 2020 04:00), Max: 100 (08 Sep 2020 21:31)  HR: 78 (09 Sep 2020 04:00) (78 - 116)  BP: 122/74 (09 Sep 2020 04:00) (114/81 - 148/83)  BP(mean): --  RR: 17 (09 Sep 2020 04:00) (17 - 18)  SpO2: 100% (09 Sep 2020 04:00) (100% - 100%)    **************************  Constitutional: Alert & Oriented x3, No acute distress, cooperative   Respiratory: Clear to ausculation bilaterally; no wheezing, rhonchi, or crackles  Cardiovascular: S1 &S2 physiologic, no murmurs, or gallops  Gastrointestinal: point tenderness in LLQ, otherwise soft, nondistended, no reboud or guarding, positive bowel sounds, no rashes  Speculum exam: copious white vaginal discharge, cervix appears pink and closed, no vaginal bleeding or abnormal discharge  Pelvic exam: uterus normal in size, mobile, nontender, no CMT, no adnexal masses palpated   Extremities: no calf tenderness or swelling      LABS:                        9.2    4.41  )-----------( 314      ( 08 Sep 2020 22:28 )             31.4     09-    138  |  104  |  6<L>  ----------------------------<  95  4.1   |  24  |  0.80    Ca    8.6      08 Sep 2020 22:28    TPro  6.8  /  Alb  4.2  /  TBili  0.2  /  DBili  x   /  AST  11  /  ALT  13  /  AlkPhos  53  09-08    PT/INR - ( 08 Sep 2020 22:28 )   PT: 12.2 sec;   INR: 1.02          PTT - ( 08 Sep 2020 22:28 )  PTT:28.9 sec  Urinalysis Basic - ( 08 Sep 2020 22:51 )    Color: Yellow / Appearance: SL Cloudy / S.010 / pH: x  Gluc: x / Ketone: NEGATIVE  / Bili: Negative / Urobili: 0.2 E.U./dL   Blood: x / Protein: Trace mg/dL / Nitrite: NEGATIVE   Leuk Esterase: NEGATIVE / RBC: < 5 /HPF / WBC < 5 /HPF   Sq Epi: x / Non Sq Epi: Moderate /HPF / Bacteria: Present /HPF        RADIOLOGY & ADDITIONAL STUDIES:  AM:  CT ABDOMEN AND PELVIS IC                          PROCEDURE DATE:  2020          INTERPRETATION:  CT SCAN OF ABDOMEN AND PELVIS    History: Left lower quadrant pain.    Technique: CT scan of abdomen and pelvis was performed from lung bases through symphysis pubis. Intravenous contrast was utilized and oral contrast was not given. Axial, sagittal and coronal reformatted images were reviewed.    Comparison: US pelvis from 20.    Findings:    Lower chest: Normal.    Liver:  Hepatomegaly.    Gallbladder: No radiopaque stones gallbladder.    Spleen:  Normal.    Pancreas:  Normal.    Adrenal glands:  Normal.    Kidneys: Normal.    Adenopathy:  No lymphadenopathy in abdomen or pelvis.    Ascites: None.    Gastrointestinal tract: Evaluation of the GI tract without the use of oral contrast shows no appreciable luminal dilation or bowel wall thickening. Appendix is normal. Scattered colonic diverticula.    Vessels: Normal.    Pelvic organs: 3.0 x 3.4 posterior uterine body fibroid.    Soft tissues: Normal.    Bones: Minimal s-shaped curvature of the thoracolumbar spine.    Impression:  No acute intra-abdominal pathology.    3.4 cm uterine fibroid.    Hepatomegaly.    I, Yayo Walls MD, have reviewed the images and the report and agree with the findings, with the following modification: There is appearance of mild colonic wall thickening involving the transverse and proximal descending colon suggesting colitis.          Thank you for the opportunity to participate in the care of this patient.    YADIRA JOLLEY M.D., RADIOLOGY RESIDENT  This document has been electronically signed.  YAYO WALLS M.D., ATTENDING RADIOLOGIST  This document has been electronically signed. Sep  9 2020  2:26AM      EXAM:  US TRANSVAGINAL                          EXAM:  US PELVIC COMPLETE                          PROCEDURE DATE:  2020          INTERPRETATION:  PELVIC ULTRASOUND dated 2020 11:04 PM    INDICATION: Left pelvic pain, history of PCOS.  AGE: 35 LMP: 8/15/2020    TECHNIQUE: Transabdominal views of the pelvis were obtained followed by transvaginal views for better visualization of the ovaries.    PRIOR STUDIES: None    FINDINGS:  The uterus is normal in size and shape, measuring 8.6 x 4.2 x 4.9 cm. There is a 2.8 x 3.0 x 2.8 cm posterior uterine fibroid. The endometrium is 1.4 cm in thickness, which is normal. Trace amount of fluid is seen within the cervix.    The right ovary is normal in size, measuring 3.3 x 1.7 x 2.8 cm with a calculated volume of 7.9 mL. The left ovary is normal in size, measuring 3.3 x 3.8 x 3.5 cm with a calculated volume of 20 2. mL. Doppler evaluation demonstrates flow to both ovaries with no evidence of torsion.    No free fluid is seen in the cul-de-sac.      IMPRESSION:  No acute pathology.    3.0 cm uterine fibroid.          Thank you for the opportunity to participate in the care of this patient.    YADIRA JOLLEY M.D., RADIOLOGY RESIDENT  This document has been electronically signed.  YAYO WALLS M.D., ATTENDING RADIOLOGIST  This document has been electronically signed. Sep  9 2020 12:52AM

## 2020-09-13 DIAGNOSIS — N83.202 UNSPECIFIED OVARIAN CYST, LEFT SIDE: ICD-10-CM

## 2020-09-13 DIAGNOSIS — R10.32 LEFT LOWER QUADRANT PAIN: ICD-10-CM

## 2020-12-14 NOTE — ED ADULT TRIAGE NOTE - CCCP TRG CHIEF CMPLNT
Refill request completed per protocol.  LOV 10/14/2020.  Escribed to [facility].   back pain general

## 2021-08-11 NOTE — ED PROVIDER NOTE - TOBACCO USE
Physical Therapy     Referred by: Jai Alonso MD; Medical Diagnosis (from order):    Diagnosis Information      Diagnosis    724.4 (ICD-9-CM) - M54.16 (ICD-10-CM) - Lumbar radiculopathy                Daily Treatment Note    Visit:  11     SUBJECTIVE                                                                                                             Patient notes pain has transitioned to a soreness vs sharp pain. Feels \"pooped out\" from shopping and walking around at [facility]. Notes he was there for about 20 minutes walking around. Sore today as a result.   Pain / Symptoms:  Pain rating (out of 10): Current: 2     OBJECTIVE                                                                                                                        TREATMENT                                                                                                                  Therapeutic Exercise:  Nu step 10 minutes, level 3 and discussed home exercise program. Provided patient with PNE emphasizing difference between fatigue, delayed onset muscle soreness and pain pathological pain.   4 inch forward and lateral step up/down 1x10  Seated hip abduction machine: 3x10 45# increased resistance this visit   Single Leg Press:  3x10 55# - increased resistance this visit     Neuromuscular Re-Education:  Forward and retro walk in parallel bars  Lateral walk in parallel bars  Romberg stance on blue foam eyes open   Tandem stance - minimal UE use to correct balance this visit.  Tandem walking  Heel raises - 1x UE support to correct LOB      Skilled input: verbal instruction/cues    Writer verbally educated and received verbal consent for hand placement, positioning of patient, and techniques to be performed today from patient for clothing adjustments for techniques, therapist position for techniques and hand placement and palpation for techniques as described above and how they are pertinent to the patient's plan of care.    Home  Exercise Program: Access Code: [email or web address]  URL: [email or web address]  Date: 07/30/2021  Prepared by: Pedro Osorio    Exercises  Supine Lower Trunk Rotation - 2 x daily - 7 x weekly - 3 sets - 10 reps  Clamshell - 2 x daily - 7 x weekly - 3 sets - 10 reps  Sit to Stand with Armchair - 2 x daily - 7 x weekly - 3 sets - 10 reps  Standing March with Counter Support - 2 x daily - 7 x weekly - 3 sets - 10 reps  Side Stepping with Counter Support - 2 x daily - 7 x weekly - 3 sets - 10 reps  Seated Long Arc Quad - 2 x daily - 7 x weekly - 3 sets - 10 reps  Standing Romberg to 1/2 Tandem Stance - 3 x daily - 7 x weekly - 15 hold       ASSESSMENT                                                                                                             Russell continues to put forth good effort in therapy and is now performing more standing exercises in therapy. Patient continues to report lack of confidence and fear of falling which does improve after encouragement and showing patient the progress he is making. PT continues to stress importance of exercising more frequently and differentiating pathologic pain vs muscle soreness. Will continue to benefit from therapy to progress balance and LE strengthening to reach therapy goals.   Pain/symptoms after session (out of 10): 2    Patient Education:   Results of above outlined education: Verbalizes understanding      PLAN                                                                                                                           Suggestions for next session as indicated: Continue with LE strengthening, balance exercises and progress accordingly            Therapy procedure time and total treatment time can be found documented on the Time Entry flowsheet   Never smoker

## 2022-08-27 ENCOUNTER — APPOINTMENT (OUTPATIENT)
Dept: AFTER HOURS CARE | Facility: EMERGENCY ROOM | Age: 37
End: 2022-08-27

## 2022-10-07 ENCOUNTER — APPOINTMENT (OUTPATIENT)
Dept: AFTER HOURS CARE | Facility: EMERGENCY ROOM | Age: 37
End: 2022-10-07

## 2022-10-07 DIAGNOSIS — R51.9 HEADACHE, UNSPECIFIED: ICD-10-CM

## 2022-10-07 PROCEDURE — 99203 OFFICE O/P NEW LOW 30 MIN: CPT | Mod: 95

## 2022-10-08 PROBLEM — R51.9 HEADACHE: Status: ACTIVE | Noted: 2022-10-08

## 2022-10-08 NOTE — PHYSICAL EXAM
[No Acute Distress] : no acute distress [Well-Appearing] : well-appearing [Speech Grossly Normal] : speech grossly normal [de-identified] : CN II-XII grossly intact

## 2022-10-08 NOTE — HISTORY OF PRESENT ILLNESS
[Home] : at home, [unfilled] , at the time of the visit. [Other Location: e.g. Home (Enter Location, City,State)___] : at [unfilled] [Verbal consent obtained from patient] : the patient, [unfilled] [FreeTextEntry8] : 37F p/w migraine headache requesting Fioricet refill. Headache present for several days and typical of headaches. Pt transitioned to a new psychiatrist who does not prescribe Fioricet. Pt has failed Tylenol, Motrin, Caffeine, diclofenac, Imitrex, and beta-blockers in past. No vision/speech/motor deficit. No fever. Has PCP. Has not seen a neurologist due to "poor insurance." Pt requesting either Fioricet or Phenobarbital prescription.\par PMH: Anxiety\par Meds: Klonopin\par \par (JON Kincaid)

## 2022-10-08 NOTE — PLAN
[No new medications perscribed] : Treat in place: No new medications prescribed [By Private Vehicle] : Sent to Emergency Department by private vehicle [Neurology] : Neurology [FreeTextEntry1] : Advised ED for acute pain treatment. Will arrange f/u with neurology.

## 2022-10-08 NOTE — ASSESSMENT
[FreeTextEntry1] : MIgraine headache typical of previous. Examiner unable to prescribe either Fioricet or Phenobarbital due to controlled substance restrictions with telehealth.

## 2023-03-16 NOTE — ED ADULT NURSE NOTE - FACIAL SYMMETRY
Patient called would like another Ortho referral to get the fluid drained from his knees, states the one he chose has not called and would like to get in with someone asap. I did call the first referral place with Dr. Reba Johnson @ [facility], to see when they can get him in. I left message for them ,no answer. I also gave the phone number to patient to call as well.    Please advise thank you symmetrical

## 2023-05-03 NOTE — ED PROVIDER NOTE - CARE PLAN
For information on Fall & Injury Prevention, visit: [email or web address] OR  [email or web address] OR  https://www.cdc.gov/steadi/patient.html
Principal Discharge DX:	Exacerbation of chronic back pain

## 2025-07-21 NOTE — ED ADULT NURSE NOTE - PRIMARY CARE PROVIDER
RN entered room to find patient writhing around in bed. Eyes closed, head back muscles tense. Patient groaning and breathing hard. RN attempted to awaken patient, patient opened eyes and looked at RN and growled. Patient continued to breathe hard and roll around in bed. PA at bedside with RN witnessed behavior. Patient opened eyes and looked at PA when she spoke to patient. Patient continued to growl and moan at RN and PA.  MD and charge notified.    Non-Affiliated